# Patient Record
Sex: MALE | Race: WHITE
[De-identification: names, ages, dates, MRNs, and addresses within clinical notes are randomized per-mention and may not be internally consistent; named-entity substitution may affect disease eponyms.]

---

## 2020-01-07 NOTE — SLEEPHOME
DATE OF PROCEDURE:  01/02/2020

 

ORDERED BY:  PABLITO Levine

 

Diagnostic home sleep testing was performed due to concern for the obstructive

sleep apnea syndrome in this patient with excessive somnolence and nonrestorative

sleep.

 

9 hours and 59 minutes of data were reviewed.  There were 9 hours and 34 minutes

marked as time in bed.  During the interval marked time in bed, there were only

five hypopneic respiratory events identified of 10 seconds in duration or greater

for a respiratory event index of 0.5.  Baseline pulse rate 76, pulse rate ranged

.  Baseline saturation 96%.  Saturations fell only to 91% and testing was

performed in both the supine and nonsupine positions.

 

IMPRESSION:

Normal diagnostic home sleep test with no evidence to support the obstructive

sleep apnea syndrome seen.

## 2022-07-14 ENCOUNTER — HOSPITAL ENCOUNTER (INPATIENT)
Dept: HOSPITAL 53 - M ED | Age: 37
LOS: 5 days | Discharge: HOME | DRG: 751 | End: 2022-07-19
Attending: STUDENT IN AN ORGANIZED HEALTH CARE EDUCATION/TRAINING PROGRAM | Admitting: STUDENT IN AN ORGANIZED HEALTH CARE EDUCATION/TRAINING PROGRAM
Payer: COMMERCIAL

## 2022-07-14 VITALS — BODY MASS INDEX: 29.78 KG/M2 | WEIGHT: 201.06 LBS | HEIGHT: 69 IN

## 2022-07-14 VITALS — SYSTOLIC BLOOD PRESSURE: 116 MMHG | DIASTOLIC BLOOD PRESSURE: 57 MMHG

## 2022-07-14 DIAGNOSIS — F60.89: ICD-10-CM

## 2022-07-14 DIAGNOSIS — Z63.0: ICD-10-CM

## 2022-07-14 DIAGNOSIS — F42.9: ICD-10-CM

## 2022-07-14 DIAGNOSIS — F41.1: ICD-10-CM

## 2022-07-14 DIAGNOSIS — F33.2: Primary | ICD-10-CM

## 2022-07-14 DIAGNOSIS — F17.290: ICD-10-CM

## 2022-07-14 DIAGNOSIS — Z79.899: ICD-10-CM

## 2022-07-14 LAB
ALBUMIN SERPL BCG-MCNC: 4.6 GM/DL (ref 3.2–5.2)
ALT SERPL W P-5'-P-CCNC: 26 U/L (ref 12–78)
AMPHETAMINES UR QL SCN: POSITIVE
APAP SERPL-MCNC: < 2 UG/ML (ref 10–30)
BARBITURATES UR QL SCN: NEGATIVE
BENZODIAZ UR QL SCN: NEGATIVE
BILIRUB CONJ SERPL-MCNC: 0.1 MG/DL (ref 0–0.2)
BILIRUB SERPL-MCNC: 0.6 MG/DL (ref 0.2–1)
BUN SERPL-MCNC: 10 MG/DL (ref 7–18)
BZE UR QL SCN: NEGATIVE
CALCIUM SERPL-MCNC: 10.1 MG/DL (ref 8.5–10.1)
CANNABINOIDS UR QL SCN: NEGATIVE
CHLORIDE SERPL-SCNC: 106 MEQ/L (ref 98–107)
CO2 SERPL-SCNC: 24 MEQ/L (ref 21–32)
CREAT SERPL-MCNC: 0.92 MG/DL (ref 0.7–1.3)
ETHANOL SERPL-MCNC: < 0.003 % (ref 0–0.01)
GFR SERPL CREATININE-BSD FRML MDRD: > 60 ML/MIN/{1.73_M2} (ref 60–?)
GLUCOSE SERPL-MCNC: 97 MG/DL (ref 70–100)
HCT VFR BLD AUTO: 44.6 % (ref 42–52)
HGB BLD-MCNC: 14.9 G/DL (ref 13.5–17.5)
MCH RBC QN AUTO: 30.3 PG (ref 27–33)
MCHC RBC AUTO-ENTMCNC: 33.4 G/DL (ref 32–36.5)
MCV RBC AUTO: 90.8 FL (ref 80–96)
METHADONE UR QL SCN: NEGATIVE
OPIATES UR QL SCN: NEGATIVE
PCP UR QL SCN: NEGATIVE
PLATELET # BLD AUTO: 277 10^3/UL (ref 150–450)
POTASSIUM SERPL-SCNC: 4.5 MEQ/L (ref 3.5–5.1)
PROT SERPL-MCNC: 7.7 GM/DL (ref 6.4–8.2)
RBC # BLD AUTO: 4.91 10^6/UL (ref 4.3–6.1)
RSV RNA NPH QL NAA+PROBE: NEGATIVE
SALICYLATES SERPL-MCNC: < 1.7 MG/DL (ref 5–30)
SODIUM SERPL-SCNC: 139 MEQ/L (ref 136–145)
TSH SERPL DL<=0.005 MIU/L-ACNC: 1.33 UIU/ML (ref 0.36–3.74)
WBC # BLD AUTO: 8.4 10^3/UL (ref 4–10)

## 2022-07-14 RX ADMIN — MODAFINIL SCH MG: 100 TABLET ORAL at 15:00

## 2022-07-14 RX ADMIN — NICOTINE SCH PATCH: 21 PATCH, EXTENDED RELEASE TRANSDERMAL at 15:14

## 2022-07-14 RX ADMIN — DEXTROAMPHETAMINE SACCHARATE, AMPHETAMINE ASPARTATE, DEXTROAMPHETAMINE SULFATE AND AMPHETAMINE SULFATE SCH MG: 1.25; 1.25; 1.25; 1.25 TABLET ORAL at 15:43

## 2022-07-14 RX ADMIN — IBUPROFEN PRN MG: 400 TABLET, FILM COATED ORAL at 19:17

## 2022-07-14 SDOH — SOCIAL STABILITY - SOCIAL INSECURITY: PROBLEMS IN RELATIONSHIP WITH SPOUSE OR PARTNER: Z63.0

## 2022-07-15 VITALS — SYSTOLIC BLOOD PRESSURE: 116 MMHG | DIASTOLIC BLOOD PRESSURE: 54 MMHG

## 2022-07-15 VITALS — SYSTOLIC BLOOD PRESSURE: 118 MMHG | DIASTOLIC BLOOD PRESSURE: 70 MMHG

## 2022-07-15 RX ADMIN — DEXTROAMPHETAMINE SACCHARATE, AMPHETAMINE ASPARTATE, DEXTROAMPHETAMINE SULFATE AND AMPHETAMINE SULFATE SCH MG: 1.25; 1.25; 1.25; 1.25 TABLET ORAL at 15:01

## 2022-07-15 RX ADMIN — MODAFINIL SCH MG: 100 TABLET ORAL at 10:32

## 2022-07-15 RX ADMIN — NICOTINE SCH PATCH: 21 PATCH, EXTENDED RELEASE TRANSDERMAL at 09:36

## 2022-07-15 RX ADMIN — MODAFINIL SCH MG: 100 TABLET ORAL at 15:01

## 2022-07-15 RX ADMIN — DEXTROAMPHETAMINE SACCHARATE, AMPHETAMINE ASPARTATE, DEXTROAMPHETAMINE SULFATE AND AMPHETAMINE SULFATE SCH MG: 1.25; 1.25; 1.25; 1.25 TABLET ORAL at 09:36

## 2022-07-16 VITALS — DIASTOLIC BLOOD PRESSURE: 71 MMHG | SYSTOLIC BLOOD PRESSURE: 123 MMHG

## 2022-07-16 VITALS — DIASTOLIC BLOOD PRESSURE: 55 MMHG | SYSTOLIC BLOOD PRESSURE: 101 MMHG

## 2022-07-16 RX ADMIN — IBUPROFEN PRN MG: 400 TABLET, FILM COATED ORAL at 08:56

## 2022-07-16 RX ADMIN — MODAFINIL SCH MG: 100 TABLET ORAL at 08:15

## 2022-07-16 RX ADMIN — MODAFINIL SCH MG: 100 TABLET ORAL at 13:27

## 2022-07-16 RX ADMIN — DEXTROAMPHETAMINE SACCHARATE, AMPHETAMINE ASPARTATE, DEXTROAMPHETAMINE SULFATE AND AMPHETAMINE SULFATE SCH MG: 1.25; 1.25; 1.25; 1.25 TABLET ORAL at 15:00

## 2022-07-16 RX ADMIN — NICOTINE SCH PATCH: 21 PATCH, EXTENDED RELEASE TRANSDERMAL at 08:14

## 2022-07-16 RX ADMIN — DEXTROAMPHETAMINE SACCHARATE, AMPHETAMINE ASPARTATE, DEXTROAMPHETAMINE SULFATE AND AMPHETAMINE SULFATE SCH MG: 1.25; 1.25; 1.25; 1.25 TABLET ORAL at 08:16

## 2022-07-17 VITALS — SYSTOLIC BLOOD PRESSURE: 140 MMHG | DIASTOLIC BLOOD PRESSURE: 72 MMHG

## 2022-07-17 VITALS — SYSTOLIC BLOOD PRESSURE: 106 MMHG | DIASTOLIC BLOOD PRESSURE: 54 MMHG

## 2022-07-17 RX ADMIN — DEXTROAMPHETAMINE SACCHARATE, AMPHETAMINE ASPARTATE, DEXTROAMPHETAMINE SULFATE AND AMPHETAMINE SULFATE SCH MG: 1.25; 1.25; 1.25; 1.25 TABLET ORAL at 15:22

## 2022-07-17 RX ADMIN — MODAFINIL SCH MG: 100 TABLET ORAL at 15:22

## 2022-07-17 RX ADMIN — NICOTINE SCH PATCH: 21 PATCH, EXTENDED RELEASE TRANSDERMAL at 08:33

## 2022-07-17 RX ADMIN — DEXTROAMPHETAMINE SACCHARATE, AMPHETAMINE ASPARTATE, DEXTROAMPHETAMINE SULFATE AND AMPHETAMINE SULFATE SCH MG: 1.25; 1.25; 1.25; 1.25 TABLET ORAL at 08:36

## 2022-07-17 RX ADMIN — MODAFINIL SCH MG: 100 TABLET ORAL at 08:35

## 2022-07-18 VITALS — DIASTOLIC BLOOD PRESSURE: 69 MMHG | SYSTOLIC BLOOD PRESSURE: 141 MMHG

## 2022-07-18 VITALS — SYSTOLIC BLOOD PRESSURE: 157 MMHG | DIASTOLIC BLOOD PRESSURE: 78 MMHG

## 2022-07-18 RX ADMIN — DEXTROAMPHETAMINE SACCHARATE, AMPHETAMINE ASPARTATE, DEXTROAMPHETAMINE SULFATE AND AMPHETAMINE SULFATE SCH MG: 1.25; 1.25; 1.25; 1.25 TABLET ORAL at 13:58

## 2022-07-18 RX ADMIN — MODAFINIL SCH MG: 100 TABLET ORAL at 13:34

## 2022-07-18 RX ADMIN — NICOTINE SCH PATCH: 21 PATCH, EXTENDED RELEASE TRANSDERMAL at 08:00

## 2022-07-18 RX ADMIN — MODAFINIL SCH MG: 100 TABLET ORAL at 08:01

## 2022-07-18 RX ADMIN — DEXTROAMPHETAMINE SACCHARATE, AMPHETAMINE ASPARTATE, DEXTROAMPHETAMINE SULFATE AND AMPHETAMINE SULFATE SCH MG: 1.25; 1.25; 1.25; 1.25 TABLET ORAL at 08:01

## 2022-07-19 VITALS — SYSTOLIC BLOOD PRESSURE: 124 MMHG | DIASTOLIC BLOOD PRESSURE: 60 MMHG

## 2022-07-19 RX ADMIN — IBUPROFEN PRN MG: 400 TABLET, FILM COATED ORAL at 09:39

## 2022-07-19 RX ADMIN — MODAFINIL SCH MG: 100 TABLET ORAL at 08:30

## 2022-07-19 RX ADMIN — NICOTINE SCH PATCH: 21 PATCH, EXTENDED RELEASE TRANSDERMAL at 08:31

## 2022-07-19 RX ADMIN — DEXTROAMPHETAMINE SACCHARATE, AMPHETAMINE ASPARTATE, DEXTROAMPHETAMINE SULFATE AND AMPHETAMINE SULFATE SCH MG: 1.25; 1.25; 1.25; 1.25 TABLET ORAL at 08:30
